# Patient Record
Sex: FEMALE | Race: WHITE | Employment: FULL TIME | URBAN - NONMETROPOLITAN AREA
[De-identification: names, ages, dates, MRNs, and addresses within clinical notes are randomized per-mention and may not be internally consistent; named-entity substitution may affect disease eponyms.]

---

## 2019-03-01 PROBLEM — Z12.11 COLON CANCER SCREENING: Status: ACTIVE | Noted: 2019-03-01

## 2019-04-14 PROBLEM — Z12.11 COLON CANCER SCREENING: Status: RESOLVED | Noted: 2019-03-01 | Resolved: 2019-04-14

## 2021-12-02 PROBLEM — R06.02 SHORTNESS OF BREATH: Status: ACTIVE | Noted: 2021-12-02

## 2021-12-02 PROBLEM — R09.89 CHEST CONGESTION: Status: ACTIVE | Noted: 2021-12-02

## 2023-01-20 ENCOUNTER — OFFICE VISIT (OUTPATIENT)
Dept: PRIMARY CARE CLINIC | Age: 63
End: 2023-01-20

## 2023-01-20 VITALS
WEIGHT: 153.8 LBS | HEART RATE: 90 BPM | SYSTOLIC BLOOD PRESSURE: 106 MMHG | DIASTOLIC BLOOD PRESSURE: 62 MMHG | RESPIRATION RATE: 18 BRPM | OXYGEN SATURATION: 96 % | HEIGHT: 68 IN | BODY MASS INDEX: 23.31 KG/M2

## 2023-01-20 DIAGNOSIS — M25.552 CHRONIC LEFT HIP PAIN: Primary | ICD-10-CM

## 2023-01-20 DIAGNOSIS — G89.29 CHRONIC LEFT HIP PAIN: Primary | ICD-10-CM

## 2023-01-20 RX ORDER — TRIAMCINOLONE ACETONIDE 40 MG/ML
80 INJECTION, SUSPENSION INTRA-ARTICULAR; INTRAMUSCULAR ONCE
Status: COMPLETED | OUTPATIENT
Start: 2023-01-20 | End: 2023-01-20

## 2023-01-20 RX ADMIN — TRIAMCINOLONE ACETONIDE 80 MG: 40 INJECTION, SUSPENSION INTRA-ARTICULAR; INTRAMUSCULAR at 14:30

## 2023-01-20 ASSESSMENT — PATIENT HEALTH QUESTIONNAIRE - PHQ9
SUM OF ALL RESPONSES TO PHQ QUESTIONS 1-9: 0
SUM OF ALL RESPONSES TO PHQ9 QUESTIONS 1 & 2: 0
2. FEELING DOWN, DEPRESSED OR HOPELESS: 0
SUM OF ALL RESPONSES TO PHQ QUESTIONS 1-9: 0
1. LITTLE INTEREST OR PLEASURE IN DOING THINGS: 0

## 2023-01-20 NOTE — PROGRESS NOTES
After obtaining consent, and per orders of Dr. Alvaro Benjamin, injection of Kenalog given in Left vastus lateralis by Reyna Gupta LPN. Patient instructed to report any adverse reaction to office immediately.

## 2023-01-20 NOTE — PROGRESS NOTES
Hip Pain   Patient has complaint of left hip pain that has been gradually worsening for 8 months. She has taken kenalog injections in the past with good relief  Nature of Onset and Mechanism -  gradually worsening. Patient reports that she does go to acupuncture and it does relieve some of the pain but only for short times.  Location - Left hip  Severity (0-10) - 7  Aggravating Factors - twisting, walking, climbing stairs, changing positions, and exercise  Relieving Factors/Treatment tried - rest  She is travelling tomorrow to take care of her daughter and would like kenalog injection for pain relief  Xrays in 2017 showed joint space narrowing and lat subluxation,changes of previous surgery.    .    Treatment to date: none.    CURRENT ALLERGIES: Amoxicillin    PAST MEDICAL HISTORY:   Past Medical History:   Diagnosis Date    Cervical cancer (HCC)        SURGICAL HISTORY:   Past Surgical History:   Procedure Laterality Date    HIP SURGERY      left       FAMILY HISTORY:   Family History   Problem Relation Age of Onset    No Known Problems Paternal Grandfather     No Known Problems Paternal Grandmother     Stroke Maternal Grandmother     No Known Problems Maternal Grandfather     Lung Cancer Father     Stroke Mother     No Known Problems Brother     No Known Problems Sister     No Known Problems Other     Breast Cancer Neg Hx     Cancer Neg Hx     Colon Cancer Neg Hx     Diabetes Neg Hx     Eclampsia Neg Hx     Hypertension Neg Hx     Ovarian Cancer Neg Hx      Labor Neg Hx     Spont Abortions Neg Hx        SOCIAL HISTORY:   Social History     Tobacco Use    Smoking status: Never    Smokeless tobacco: Never   Substance Use Topics    Alcohol use: No    Drug use: No         Review of Systems:  Constitutional: negative for fevers or chills  Gastrointestinal: negative for abd pain, nausea, vomiting, diarrhea or constipation  Genitourinary: negative for dysuria, urgency or frequency  Musculoskeletal:has chronic  pain lt hip  Neurological: negative for unilateral weakness, numbness or tingling. Objective:  /62 (Site: Right Upper Arm, Position: Sitting, Cuff Size: Medium Adult)   Pulse 90   Resp 18   Ht 5' 8\" (1.727 m)   Wt 153 lb 12.8 oz (69.8 kg)   LMP 07/28/2012   SpO2 96%   BMI 23.39 kg/m²    GEN:  She is alert and oriented, no distress    EXT:     Extremities: + 2 pedal pulses, no edema or calf tenderness, and warm to touch. Normal nails without lesions  EXT:    LEFT hip:  has pain on inversion. Flexion and inversion is painful,heel impact is neg,no leg shortening  has minimal tenderness ant hip. Assessment:  1.  Chronic left hip pain          Plan:  Heat or Ice to affected hip PRN pain or swelling  Kenalog 2 cc im  Declines any health screening including mammogram,colonoscopy        Electronically signed by Delvin Josue MD on 1/20/2023 at 2:22 PM

## 2023-01-20 NOTE — PATIENT INSTRUCTIONS
SURVEY:    You may be receiving a survey from [employer] regarding your visit today. You may get this in the mail, through your MyChart, or in your email. Please complete the survey to enable us to provide the highest quality of care to you and your family. If you cannot score us a very good (5 Stars) on any question, please call the office to discuss how we could of made your experience exceptional.    Thank you!     Dr. Abbie Pennington, LPROBERT Dougherty, [address] [place] [address]    Phone: [phone]  Fax: [phone]    Office Hours:   Elysia Ochoa, [address], F: 8-5